# Patient Record
Sex: MALE | Race: WHITE | NOT HISPANIC OR LATINO | URBAN - NONMETROPOLITAN AREA
[De-identification: names, ages, dates, MRNs, and addresses within clinical notes are randomized per-mention and may not be internally consistent; named-entity substitution may affect disease eponyms.]

---

## 2017-08-25 ENCOUNTER — OFFICE VISIT (OUTPATIENT)
Dept: CARDIOLOGY | Facility: CLINIC | Age: 47
End: 2017-08-25

## 2017-08-25 VITALS
DIASTOLIC BLOOD PRESSURE: 96 MMHG | OXYGEN SATURATION: 98 % | SYSTOLIC BLOOD PRESSURE: 145 MMHG | RESPIRATION RATE: 16 BRPM | BODY MASS INDEX: 41.86 KG/M2 | HEART RATE: 58 BPM | HEIGHT: 71 IN | WEIGHT: 299 LBS

## 2017-08-25 DIAGNOSIS — E78.5 DYSLIPIDEMIA: ICD-10-CM

## 2017-08-25 DIAGNOSIS — I10 ESSENTIAL HYPERTENSION: ICD-10-CM

## 2017-08-25 DIAGNOSIS — R07.2 PRECORDIAL PAIN: Primary | ICD-10-CM

## 2017-08-25 PROBLEM — IMO0001 BRASH: Status: RESOLVED | Noted: 2017-08-25 | Resolved: 2017-08-25

## 2017-08-25 PROBLEM — IMO0001 BRASH: Status: ACTIVE | Noted: 2017-08-25

## 2017-08-25 PROCEDURE — 93000 ELECTROCARDIOGRAM COMPLETE: CPT | Performed by: INTERNAL MEDICINE

## 2017-08-25 PROCEDURE — 99204 OFFICE O/P NEW MOD 45 MIN: CPT | Performed by: INTERNAL MEDICINE

## 2017-08-25 RX ORDER — PROPRANOLOL HYDROCHLORIDE 20 MG/1
20 TABLET ORAL 2 TIMES DAILY
Qty: 60 TABLET | Refills: 6 | Status: SHIPPED | OUTPATIENT
Start: 2017-08-25

## 2017-08-25 RX ORDER — LISINOPRIL 30 MG/1
30 TABLET ORAL DAILY
COMMUNITY
End: 2017-08-25

## 2017-08-25 RX ORDER — ESOMEPRAZOLE MAGNESIUM 40 MG/1
40 CAPSULE, DELAYED RELEASE ORAL
COMMUNITY

## 2017-08-25 RX ORDER — LISINOPRIL 40 MG/1
40 TABLET ORAL DAILY
Qty: 30 TABLET | Refills: 11 | Status: SHIPPED | OUTPATIENT
Start: 2017-08-25

## 2017-08-25 RX ORDER — PROPRANOLOL HYDROCHLORIDE 40 MG/1
40 TABLET ORAL 2 TIMES DAILY
COMMUNITY
End: 2017-08-25

## 2017-08-25 NOTE — PROGRESS NOTES
"RADHA Lerner  Duglas Ernst  : 1970  DATE:2017    Patient Active Problem List   Diagnosis   • Essential hypertension   • Precordial pain   • Dyslipidemia       Dear RADHA Lerner:    Subjective     Duglas Ernst is a 47 y.o. male with the above medical problems who is being seen for consultation today at the request of RADHA Lerner. According to the patient has been having episodes of chest pain.  Since the chest pain is located over his left chest, oppressive, lasting anywhere from 10-15 minutes and resolving spontaneously.  He states the pain is improved by beta blockers, associated with sweating and shortness of breath.  Worsened by exertion and 5/10 on the pain scale.  He states the onset is usually during exertion.  He is a former smoker who quit 8 years ago.  He denies any palpitations, orthopnea, PND, dyspnea, lower extremity edema, syncope but does complain of some dizziness.  According to the patient he underwent an EKG at his primary care physician's office and was told \"his right heart was enlarged\".  A review of his EKG today shows an incomplete right bundle-branch block but no evidence of right heart enlargement.      Past Medical History:   Diagnosis Date   • Fistula, anal    • GERD (gastroesophageal reflux disease)    • Hyperlipidemia    • Hypertension        Past Surgical History:   Procedure Laterality Date   • SHOULDER ARTHROSCOPY     • TONSILLECTOMY     • UMBILICAL HERNIA REPAIR Bilateral        Family History   Problem Relation Age of Onset   • Hypertension Mother    • Heart disease Father    • Heart disease Sister    • Heart attack Paternal Grandmother    • Heart failure Paternal Grandmother    • Heart attack Paternal Grandfather    • Heart failure Paternal Grandfather        Social History     Social History   • Marital status: Unknown     Spouse name: N/A   • Number of children: N/A   • Years of education: N/A     Occupational History   • " Not on file.     Social History Main Topics   • Smoking status: Former Smoker     Types: Cigarettes     Quit date: 8/25/2010   • Smokeless tobacco: Former User     Quit date: 8/25/2010   • Alcohol use Not on file   • Drug use: No   • Sexual activity: Defer     Other Topics Concern   • Not on file     Social History Narrative   • No narrative on file         Current Outpatient Prescriptions:   •  esomeprazole (nexIUM) 40 MG capsule, Take 40 mg by mouth Every Morning Before Breakfast., Disp: , Rfl:   •  lisinopril (PRINIVIL,ZESTRIL) 40 MG tablet, Take 1 tablet by mouth Daily., Disp: 30 tablet, Rfl: 11  •  propranolol (INDERAL) 20 MG tablet, Take 1 tablet by mouth 2 (Two) Times a Day., Disp: 60 tablet, Rfl: 6    The following portions of the patient's history were reviewed and updated as appropriate: allergies, current medications, past family history, past medical history, past social history, past surgical history and problem list.    Review of Systems   Constitution: Positive for malaise/fatigue, night sweats and weight loss (18lbs x 1 month since started walking qd). Negative for diaphoresis, fever, weakness and weight gain.        Pt started walking approx 3 miles qd x 1 month     HENT: Positive for headaches (migraines ). Negative for congestion, ear discharge, ear pain, hearing loss, hoarse voice, nosebleeds, sore throat and tinnitus.    Eyes: Negative for blurred vision, discharge, double vision and pain.   Cardiovascular: Positive for chest pain (lt sided dull ache with pressure) and dyspnea on exertion. Negative for irregular heartbeat, leg swelling, orthopnea, palpitations and paroxysmal nocturnal dyspnea.   Respiratory: Positive for cough and snoring. Negative for hemoptysis, shortness of breath and wheezing.    Endocrine: Negative for cold intolerance, heat intolerance, polydipsia, polyphagia and polyuria.   Hematologic/Lymphatic: Negative for bleeding problem. Does not bruise/bleed easily.   Skin:  "Positive for poor wound healing. Negative for color change, dry skin, flushing, itching and rash.   Musculoskeletal: Positive for back pain. Negative for arthritis, joint pain, joint swelling, muscle cramps, muscle weakness, neck pain and stiffness.   Gastrointestinal: Negative for abdominal pain, change in bowel habit, constipation, diarrhea, heartburn, nausea and vomiting.   Genitourinary: Negative for bladder incontinence, decreased libido, dysuria, frequency and hematuria.   Neurological: Positive for light-headedness (only upon standing quickly). Negative for disturbances in coordination, dizziness, focal weakness, loss of balance, numbness, paresthesias, seizures and tremors.   Psychiatric/Behavioral: Negative for altered mental status, depression and memory loss. The patient does not have insomnia and is not nervous/anxious.    Allergic/Immunologic: Negative for hives.       Objective   Blood pressure 145/96, pulse 58, resp. rate 16, height 71\" (180.3 cm), weight 299 lb (136 kg), SpO2 98 %.    Physical Exam   Constitutional: He is oriented to person, place, and time. He appears well-developed and well-nourished.   Obese WM sitting comfortably on chair.   HENT:   Mouth/Throat: Oropharynx is clear and moist.   Eyes: EOM are normal. Pupils are equal, round, and reactive to light.   Neck: Neck supple. No JVD present. No tracheal deviation present. No thyromegaly present.   Cardiovascular: Normal rate, regular rhythm, S1 normal and S2 normal.  Exam reveals no gallop and no friction rub.    No murmur heard.  Pulmonary/Chest: Effort normal and breath sounds normal.   Abdominal: Soft. Bowel sounds are normal. He exhibits no mass. There is no tenderness.   Musculoskeletal: Normal range of motion. He exhibits no edema.   Lymphadenopathy:     He has no cervical adenopathy.   Neurological: He is alert and oriented to person, place, and time.   Skin: Skin is warm and dry. No rash noted.   Psychiatric: He has a normal mood " and affect.         ECG 12 Lead  Date/Time: 8/25/2017 1:20 PM  Performed by: WON WOODWARD  Authorized by: WON WOODWARD   Comparison: not compared with previous ECG   Previous ECG: no previous ECG available  Rhythm: sinus rhythm  Rate: normal  BPM: 60  Conduction: incomplete RBBB  ST Segments: ST segments normal  T Waves: T waves normal  QRS axis: normal  Other: no other findings  Clinical impression: abnormal ECG            Assessment/Plan      1.  Chest pain: Patient with chest pain with some typical and some atypical features for coronary artery disease.  This point will need to evaluate further with stress test and echocardiogram.  We'll also check for any structural abnormalities on echocardiogram.    2.  Hypertension: Patient received hypertension which is currently uncontrolled.  However the patient is complaining off and lack of energy on current regimen.  He is noted to be mildly bradycardic.  This point we will decrease propanolol dose to 20 mg by mouth twice a day and increase lisinopril to 40 mg by mouth daily.  We will need to monitor for stability.    3.  Dyslipidemia: Patient with a history of dyslipidemia with no recent lipid panel record.  According to the patient he had a recent lipid panel done by his primary care provider.  Will obtain.       Diagnosis Plan   1. Precordial pain  Adult Transthoracic Echo Complete    Treadmill Stress Test   2. Essential hypertension     3. Dyslipidemia            Return in about 4 weeks (around 9/22/2017).    I appreciate the opportunity to participate in this patient's cardiovascular care.    Best Regards    Won Maciel

## 2017-08-28 ENCOUNTER — TELEPHONE (OUTPATIENT)
Dept: CARDIOLOGY | Facility: CLINIC | Age: 47
End: 2017-08-28

## 2017-08-28 NOTE — TELEPHONE ENCOUNTER
----- Message from Zachery Shannon MD sent at 8/25/2017  1:46 PM EDT -----   Please obtain lipid panel from PCP.      Called PCP for labs. They will fax to us.

## 2017-09-01 ENCOUNTER — HOSPITAL ENCOUNTER (OUTPATIENT)
Dept: CARDIOLOGY | Facility: HOSPITAL | Age: 47
Discharge: HOME | End: 2017-09-01
Attending: INTERNAL MEDICINE | Admitting: INTERNAL MEDICINE

## 2017-09-01 ENCOUNTER — HOSPITAL ENCOUNTER (OUTPATIENT)
Dept: CARDIOLOGY | Facility: HOSPITAL | Age: 47
Discharge: HOME | End: 2017-09-01
Attending: INTERNAL MEDICINE

## 2017-09-01 DIAGNOSIS — R07.2 PRECORDIAL PAIN: ICD-10-CM

## 2017-09-01 LAB
BH CV ECHO MEAS - % IVS THICK: 15 %
BH CV ECHO MEAS - % LVPW THICK: 32 %
BH CV ECHO MEAS - ACS: 1.9 CM
BH CV ECHO MEAS - AO ROOT AREA (BSA CORRECTED): 1.4
BH CV ECHO MEAS - AO ROOT AREA: 9.6 CM^2
BH CV ECHO MEAS - AO ROOT DIAM: 3.5 CM
BH CV ECHO MEAS - BSA(HAYCOCK): 2.7 M^2
BH CV ECHO MEAS - BSA: 2.5 M^2
BH CV ECHO MEAS - BZI_BMI: 41.7 KILOGRAMS/M^2
BH CV ECHO MEAS - BZI_METRIC_HEIGHT: 180.3 CM
BH CV ECHO MEAS - BZI_METRIC_WEIGHT: 135.6 KG
BH CV ECHO MEAS - CONTRAST EF 4CH: 63.8 ML/M^2
BH CV ECHO MEAS - EDV(CUBED): 123.1 ML
BH CV ECHO MEAS - EDV(MOD-SP4): 69 ML
BH CV ECHO MEAS - EDV(TEICH): 116.8 ML
BH CV ECHO MEAS - EF(CUBED): 81.7 %
BH CV ECHO MEAS - EF(MOD-SP4): 63.8 %
BH CV ECHO MEAS - EF(TEICH): 74.2 %
BH CV ECHO MEAS - ESV(CUBED): 22.5 ML
BH CV ECHO MEAS - ESV(MOD-SP4): 25 ML
BH CV ECHO MEAS - ESV(TEICH): 30.2 ML
BH CV ECHO MEAS - FS: 43.2 %
BH CV ECHO MEAS - IVS/LVPW: 0.8
BH CV ECHO MEAS - IVSD: 0.9 CM
BH CV ECHO MEAS - IVSS: 1 CM
BH CV ECHO MEAS - LV DIASTOLIC VOL/BSA (35-75): 27.6 ML/M^2
BH CV ECHO MEAS - LV MASS(C)D: 182.5 GRAMS
BH CV ECHO MEAS - LV MASS(C)DI: 72.9 GRAMS/M^2
BH CV ECHO MEAS - LV MASS(C)S: 108.1 GRAMS
BH CV ECHO MEAS - LV MASS(C)SI: 43.2 GRAMS/M^2
BH CV ECHO MEAS - LV SYSTOLIC VOL/BSA (12-30): 10 ML/M^2
BH CV ECHO MEAS - LVIDD: 5 CM
BH CV ECHO MEAS - LVIDS: 2.8 CM
BH CV ECHO MEAS - LVLD AP4: 9 CM
BH CV ECHO MEAS - LVLS AP4: 7.6 CM
BH CV ECHO MEAS - LVPWD: 1.1 CM
BH CV ECHO MEAS - LVPWS: 1.5 CM
BH CV ECHO MEAS - MV A MAX VEL: 71.1 CM/SEC
BH CV ECHO MEAS - MV DEC SLOPE: 354 CM/SEC^2
BH CV ECHO MEAS - MV E MAX VEL: 75.5 CM/SEC
BH CV ECHO MEAS - MV E/A: 1.1
BH CV ECHO MEAS - MV P1/2T MAX VEL: 77 CM/SEC
BH CV ECHO MEAS - MV P1/2T: 63.7 MSEC
BH CV ECHO MEAS - MVA P1/2T LCG: 2.9 CM^2
BH CV ECHO MEAS - MVA(P1/2T): 3.5 CM^2
BH CV ECHO MEAS - RAP SYSTOLE: 10 MMHG
BH CV ECHO MEAS - RVDD: 0.9 CM
BH CV ECHO MEAS - RVSP: 31.8 MMHG
BH CV ECHO MEAS - SI(CUBED): 40.2 ML/M^2
BH CV ECHO MEAS - SI(MOD-SP4): 17.6 ML/M^2
BH CV ECHO MEAS - SI(TEICH): 34.6 ML/M^2
BH CV ECHO MEAS - SV(CUBED): 100.6 ML
BH CV ECHO MEAS - SV(MOD-SP4): 44 ML
BH CV ECHO MEAS - SV(TEICH): 86.7 ML
BH CV ECHO MEAS - TR MAX VEL: 233.5 CM/SEC
BH CV STRESS BP STAGE 1: NORMAL
BH CV STRESS BP STAGE 2: NORMAL
BH CV STRESS DURATION MIN STAGE 1: 3
BH CV STRESS DURATION MIN STAGE 2: 3
BH CV STRESS DURATION SEC STAGE 1: 0
BH CV STRESS DURATION SEC STAGE 2: 0
BH CV STRESS GRADE STAGE 1: 10
BH CV STRESS GRADE STAGE 2: 12
BH CV STRESS HR STAGE 1: 115
BH CV STRESS HR STAGE 2: 136
BH CV STRESS METS STAGE 1: 5
BH CV STRESS METS STAGE 2: 7.5
BH CV STRESS PROTOCOL 1: NORMAL
BH CV STRESS RECOVERY BP: NORMAL MMHG
BH CV STRESS RECOVERY HR: 67 BPM
BH CV STRESS SPEED STAGE 1: 1.7
BH CV STRESS SPEED STAGE 2: 2.5
BH CV STRESS STAGE 1: 1
BH CV STRESS STAGE 2: 2
LV EF 2D ECHO EST: 65 %
MAXIMAL PREDICTED HEART RATE: 175 BPM
PERCENT MAX PREDICTED HR: 77.71 %
STRESS BASELINE BP: NORMAL MMHG
STRESS BASELINE HR: 95 BPM
STRESS PERCENT HR: 91 %
STRESS POST ESTIMATED WORKLOAD: 7 METS
STRESS POST EXERCISE DUR MIN: 5 MIN
STRESS POST EXERCISE DUR SEC: 15 SEC
STRESS POST PEAK BP: NORMAL MMHG
STRESS POST PEAK HR: 136 BPM
STRESS TARGET HR: 149 BPM

## 2017-09-01 PROCEDURE — 93306 TTE W/DOPPLER COMPLETE: CPT

## 2017-09-01 PROCEDURE — 93018 CV STRESS TEST I&R ONLY: CPT | Performed by: INTERNAL MEDICINE

## 2017-09-01 PROCEDURE — 93306 TTE W/DOPPLER COMPLETE: CPT | Performed by: INTERNAL MEDICINE

## 2017-09-01 PROCEDURE — 93017 CV STRESS TEST TRACING ONLY: CPT

## 2017-09-05 ENCOUNTER — TELEPHONE (OUTPATIENT)
Dept: CARDIOLOGY | Facility: CLINIC | Age: 47
End: 2017-09-05

## 2017-09-05 DIAGNOSIS — R07.2 PRECORDIAL PAIN: Primary | ICD-10-CM

## 2017-09-05 NOTE — TELEPHONE ENCOUNTER
Maria L called asking if someone could look into mederos test results. She said that she knew we would have him come in sooner because he couldn't even complete the test on Friday, I told her we would have Dr. Maciel look into it and someone would give them a call

## 2017-09-05 NOTE — TELEPHONE ENCOUNTER
The patient was unable to achieve target heart rate.  We will need to set up for nuclear stress test.  Please advise the patient this has been ordered.